# Patient Record
Sex: MALE | Race: ASIAN | NOT HISPANIC OR LATINO | URBAN - METROPOLITAN AREA
[De-identification: names, ages, dates, MRNs, and addresses within clinical notes are randomized per-mention and may not be internally consistent; named-entity substitution may affect disease eponyms.]

---
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
## 2024-08-30 ENCOUNTER — PATIENT OUTREACH (OUTPATIENT)
Dept: CARE COORDINATION | Facility: CLINIC | Age: 19
End: 2024-08-30

## 2024-08-30 NOTE — PROGRESS NOTES
Clinic Care Coordination Contact  Program:   [facility]: [place]    Renewal:UCARE   Date Applied:      RAHEEM Outreach:   8/30/24: 1st outreach attempt. Left a message on voicemail with call back information and requested return call.  Plan: CTA will call again within 2 weeks.  PHONE IS DISCONNECTED.   Yvonne Tolliver  Care   [facility]  Clinic Care Coordination  [phone]      Health Insurance:        Referral/Screening:

## 2024-09-16 ENCOUNTER — PATIENT OUTREACH (OUTPATIENT)
Dept: CARE COORDINATION | Facility: CLINIC | Age: 19
End: 2024-09-16

## 2024-09-16 NOTE — PROGRESS NOTES
Clinic Care Coordination Contact  Program:   [place]: [place]    Renewal:UCARE   Date Applied:      FR Outreach:   9/16/24: 2nd outreach attempt. Left message on voicemail indicating last outreach attempt. CTA left [facility] number for renewal follow up.  Plan: CTA will no longer make outreach  PHONE IS DISCONNECTED.  Yvonne Tolliver  Care   [facility]  Clinic Care Coordination  [phone]    8/30/24: 1st outreach attempt. Left a message on voicemail with call back information and requested return call.  Plan: CTA will call again within 2 weeks.  PHONE IS DISCONNECTED.   Yvonne Kinney   [facility]  Clinic Care Coordination  [phone]      Health Insurance:        Referral/Screening: